# Patient Record
Sex: MALE | Race: WHITE
[De-identification: names, ages, dates, MRNs, and addresses within clinical notes are randomized per-mention and may not be internally consistent; named-entity substitution may affect disease eponyms.]

---

## 2023-06-02 ENCOUNTER — HOSPITAL ENCOUNTER (OUTPATIENT)
Dept: HOSPITAL 82 - ED | Age: 88
Setting detail: OBSERVATION
LOS: 4 days | Discharge: NURSING, REHAB OR LONG-TERM CARE | End: 2023-06-06
Attending: INTERNAL MEDICINE | Admitting: INTERNAL MEDICINE
Payer: MEDICARE

## 2023-06-02 VITALS — SYSTOLIC BLOOD PRESSURE: 130 MMHG | DIASTOLIC BLOOD PRESSURE: 65 MMHG

## 2023-06-02 VITALS — DIASTOLIC BLOOD PRESSURE: 61 MMHG | SYSTOLIC BLOOD PRESSURE: 130 MMHG

## 2023-06-02 VITALS — SYSTOLIC BLOOD PRESSURE: 140 MMHG | DIASTOLIC BLOOD PRESSURE: 66 MMHG

## 2023-06-02 VITALS — SYSTOLIC BLOOD PRESSURE: 120 MMHG | DIASTOLIC BLOOD PRESSURE: 59 MMHG

## 2023-06-02 VITALS — SYSTOLIC BLOOD PRESSURE: 126 MMHG | DIASTOLIC BLOOD PRESSURE: 63 MMHG

## 2023-06-02 VITALS — HEIGHT: 70 IN | BODY MASS INDEX: 25.94 KG/M2 | WEIGHT: 181.22 LBS

## 2023-06-02 VITALS — DIASTOLIC BLOOD PRESSURE: 57 MMHG | SYSTOLIC BLOOD PRESSURE: 116 MMHG

## 2023-06-02 VITALS — DIASTOLIC BLOOD PRESSURE: 57 MMHG | SYSTOLIC BLOOD PRESSURE: 125 MMHG

## 2023-06-02 VITALS — SYSTOLIC BLOOD PRESSURE: 128 MMHG | DIASTOLIC BLOOD PRESSURE: 62 MMHG

## 2023-06-02 VITALS — SYSTOLIC BLOOD PRESSURE: 128 MMHG | DIASTOLIC BLOOD PRESSURE: 64 MMHG

## 2023-06-02 VITALS — SYSTOLIC BLOOD PRESSURE: 115 MMHG | DIASTOLIC BLOOD PRESSURE: 59 MMHG

## 2023-06-02 VITALS — DIASTOLIC BLOOD PRESSURE: 52 MMHG | SYSTOLIC BLOOD PRESSURE: 112 MMHG

## 2023-06-02 VITALS — DIASTOLIC BLOOD PRESSURE: 59 MMHG | SYSTOLIC BLOOD PRESSURE: 129 MMHG

## 2023-06-02 DIAGNOSIS — Z91.81: ICD-10-CM

## 2023-06-02 DIAGNOSIS — D72.829: ICD-10-CM

## 2023-06-02 DIAGNOSIS — Z95.5: ICD-10-CM

## 2023-06-02 DIAGNOSIS — I10: ICD-10-CM

## 2023-06-02 DIAGNOSIS — R62.7: ICD-10-CM

## 2023-06-02 DIAGNOSIS — N17.9: ICD-10-CM

## 2023-06-02 DIAGNOSIS — F03.90: ICD-10-CM

## 2023-06-02 DIAGNOSIS — R53.1: Primary | ICD-10-CM

## 2023-06-02 DIAGNOSIS — I25.10: ICD-10-CM

## 2023-06-02 DIAGNOSIS — Z87.891: ICD-10-CM

## 2023-06-02 DIAGNOSIS — I95.9: ICD-10-CM

## 2023-06-02 LAB
ALBUMIN SERPL-MCNC: 4.1 G/DL (ref 3.2–5)
ALP SERPL-CCNC: 101 U/L (ref 38–126)
ANION GAP SERPL CALCULATED.3IONS-SCNC: 12 MMOL/L
AST SERPL-CCNC: 31 U/L (ref 19–48)
BASOPHILS NFR BLD AUTO: 0.3 % (ref 0–3)
BUN SERPL-MCNC: 33 MG/DL (ref 8–23)
BUN/CREAT SERPL: 19
CHLORIDE SERPL-SCNC: 104 MMOL/L (ref 95–108)
CO2 SERPL-SCNC: 25 MMOL/L (ref 22–30)
CREAT SERPL-MCNC: 1.7 MG/DL (ref 0.7–1.3)
EOSINOPHIL NFR BLD AUTO: 0.9 % (ref 0–8)
ERYTHROCYTE [DISTWIDTH] IN BLOOD BY AUTOMATED COUNT: 17.3 % (ref 11.5–15.5)
HCT VFR BLD AUTO: 37.2 % (ref 39–50)
HGB BLD-MCNC: 11.9 G/DL (ref 14–18)
IMM GRANULOCYTES NFR BLD: 0.7 % (ref 0–5)
LYMPHOCYTES NFR BLD: 10.1 % (ref 15–41)
MCH RBC QN AUTO: 30.5 PG  CALC (ref 26–32)
MCHC RBC AUTO-ENTMCNC: 32 G/DL CAL (ref 32–36)
MCV RBC AUTO: 95.4 FL  CALC (ref 80–100)
MONOCYTES NFR BLD AUTO: 12.7 % (ref 2–13)
NEUTROPHILS # BLD AUTO: 11.37 THOU/UL (ref 1.82–7.42)
NEUTROPHILS NFR BLD AUTO: 75.3 % (ref 42–76)
PLATELET # BLD AUTO: 213 THOU/UL (ref 130–400)
POTASSIUM SERPL-SCNC: 4.9 MMOL/L (ref 3.5–5.1)
PROT SERPL-MCNC: 6.8 G/DL (ref 6.3–8.2)
RBC # BLD AUTO: 3.9 MILL/UL (ref 4.7–6.1)
SODIUM SERPL-SCNC: 136 MMOL/L (ref 137–146)

## 2023-06-02 NOTE — NUR
IN ROOM TO COLLECT URINE, PT REFUSED, WANTS TO WAIT [facility]NTIL [facility]

## 2023-06-02 NOTE — NUR
REPORT RECEIVED FROM ER NURSE. PATIENT ARRIVED TO ROOM 280 AT 2120 VIA
STRETCHER. DENIES PAIN OR DISCOMFORT. RESPIRATIONS EVEN AND UNLABORED ON ROOM
AIR. ORIENTED TO ROOM AND CALL LIGHT. MEDICATED PER MD ORDER. NEW ORDER
RECEIVED FOR SLEEP AID PER PATIENT REQUEST. CALL LIGHT WITHIN REACH.

## 2023-06-03 VITALS — DIASTOLIC BLOOD PRESSURE: 51 MMHG | SYSTOLIC BLOOD PRESSURE: 117 MMHG

## 2023-06-03 VITALS — SYSTOLIC BLOOD PRESSURE: 154 MMHG | DIASTOLIC BLOOD PRESSURE: 76 MMHG

## 2023-06-03 VITALS — DIASTOLIC BLOOD PRESSURE: 69 MMHG | SYSTOLIC BLOOD PRESSURE: 138 MMHG

## 2023-06-03 VITALS — SYSTOLIC BLOOD PRESSURE: 111 MMHG | DIASTOLIC BLOOD PRESSURE: 50 MMHG

## 2023-06-03 VITALS — SYSTOLIC BLOOD PRESSURE: 134 MMHG | DIASTOLIC BLOOD PRESSURE: 65 MMHG

## 2023-06-03 VITALS — SYSTOLIC BLOOD PRESSURE: 139 MMHG | DIASTOLIC BLOOD PRESSURE: 67 MMHG

## 2023-06-03 VITALS — DIASTOLIC BLOOD PRESSURE: 50 MMHG | SYSTOLIC BLOOD PRESSURE: 111 MMHG

## 2023-06-03 VITALS — DIASTOLIC BLOOD PRESSURE: 55 MMHG | SYSTOLIC BLOOD PRESSURE: 124 MMHG

## 2023-06-03 LAB
ALBUMIN SERPL-MCNC: 3.7 G/DL (ref 3.2–5)
ALP SERPL-CCNC: 99 U/L (ref 38–126)
ANION GAP SERPL CALCULATED.3IONS-SCNC: 9 MMOL/L
AST SERPL-CCNC: 31 U/L (ref 19–48)
BILIRUB UR QL STRIP.AUTO: NEGATIVE
BUN SERPL-MCNC: 29 MG/DL (ref 8–23)
BUN/CREAT SERPL: 23
CHLORIDE SERPL-SCNC: 106 MMOL/L (ref 95–108)
CO2 SERPL-SCNC: 28 MMOL/L (ref 22–30)
COLOR UR AUTO: YELLOW
CREAT SERPL-MCNC: 1.3 MG/DL (ref 0.7–1.3)
ERYTHROCYTE [DISTWIDTH] IN BLOOD BY AUTOMATED COUNT: 17.2 % (ref 11.5–15.5)
GLUCOSE UR STRIP.AUTO-MCNC: NEGATIVE MG/DL
HCT VFR BLD AUTO: 36.7 % (ref 39–50)
HGB BLD-MCNC: 11.9 G/DL (ref 14–18)
HGB UR QL STRIP.AUTO: NEGATIVE
KETONES UR STRIP.AUTO-MCNC: NEGATIVE MG/DL
LEUKOCYTE ESTERASE UR QL STRIP.AUTO: NEGATIVE
MAGNESIUM SERPL-MCNC: 2.3 MG/DL (ref 1.6–2.3)
MCH RBC QN AUTO: 30.7 PG  CALC (ref 26–32)
MCHC RBC AUTO-ENTMCNC: 32.4 G/DL CAL (ref 32–36)
MCV RBC AUTO: 94.6 FL  CALC (ref 80–100)
NITRITE UR QL STRIP.AUTO: NEGATIVE
PH UR STRIP.AUTO: 5.5 [PH] (ref 4.5–8)
PLATELET # BLD AUTO: 210 THOU/UL (ref 130–400)
POTASSIUM SERPL-SCNC: 4.9 MMOL/L (ref 3.5–5.1)
PROT SERPL-MCNC: 6.4 G/DL (ref 6.3–8.2)
PROT UR QL STRIP.AUTO: NEGATIVE MG/DL
RBC # BLD AUTO: 3.88 MILL/UL (ref 4.7–6.1)
SODIUM SERPL-SCNC: 138 MMOL/L (ref 137–146)
SP GR UR STRIP.AUTO: 1.01
UROBILINOGEN UR QL STRIP.AUTO: 0.2 E.U./DL

## 2023-06-03 NOTE — NUR
PT RESTING IN BED WATCHING TV, NO SIGNS OF DISTRESS NOTED, RESP EVEN AND
UNLABORED. PT ALERT AND ORIENTED X3, DISCUSSED POC, VERBALIZED UNDERSTANDING.
NO EDEMA. PT HAS A SKIN TEAR TO RFA, DRESSING CDI. IVF INFUSING, ASSESSMENT
COMPLETED, DENIES ANY NEEDS OR COMPLAINTS AT THIS TIME. BED ALARM FOR SAFETY,
CALL LIGHT IN REACH,CONTINUE TO MONITOR.

## 2023-06-03 NOTE — NUR
PATIENT AWAKE IN BED. DENIES PAIN OR DISCOMFORT. RESPIRATIONS EVEN AND
UNLABORED ON ROOM AIR. CALL LIGHT WITHIN REACH.

## 2023-06-03 NOTE — NUR
PATIENT ASLEEP IN BED BUT EASILY AROUSED. RESPIRATIONS EVEN AND UNLABORED ON
ROOM AIR. ABLE TO USE URINAL INDEPENDENTLY. DENIES PAIN OR DISCOMFORT. CALL
LIGHT WITHIN REACH.

## 2023-06-03 NOTE — NUR
PT RESTING IN BED. NO CURRENT NEEDS. STATES HE IS LOOKING FORWARD TO GOING TO
REHAB TO GET STRONGER. VSS. ALL SAFETY MEASURES IN PLACE.

## 2023-06-03 NOTE — NUR
REPORT RECEIVED FROM PM RN. PT RESTING IN BED. ALL SAFETY MEASURES IN PLACE.
VSS. NO NEEDS AT THIS TIME.

## 2023-06-04 VITALS — SYSTOLIC BLOOD PRESSURE: 113 MMHG | DIASTOLIC BLOOD PRESSURE: 56 MMHG

## 2023-06-04 VITALS — DIASTOLIC BLOOD PRESSURE: 65 MMHG | SYSTOLIC BLOOD PRESSURE: 134 MMHG

## 2023-06-04 VITALS — SYSTOLIC BLOOD PRESSURE: 115 MMHG | DIASTOLIC BLOOD PRESSURE: 57 MMHG

## 2023-06-04 VITALS — DIASTOLIC BLOOD PRESSURE: 71 MMHG | SYSTOLIC BLOOD PRESSURE: 141 MMHG

## 2023-06-04 LAB
ALBUMIN SERPL-MCNC: 3.5 G/DL (ref 3.2–5)
ALP SERPL-CCNC: 98 U/L (ref 38–126)
ANION GAP SERPL CALCULATED.3IONS-SCNC: 8 MMOL/L
AST SERPL-CCNC: 40 U/L (ref 19–48)
BUN SERPL-MCNC: 23 MG/DL (ref 8–23)
BUN/CREAT SERPL: 23
CHLORIDE SERPL-SCNC: 107 MMOL/L (ref 95–108)
CO2 SERPL-SCNC: 26 MMOL/L (ref 22–30)
CREAT SERPL-MCNC: 1 MG/DL (ref 0.7–1.3)
ERYTHROCYTE [DISTWIDTH] IN BLOOD BY AUTOMATED COUNT: 17.3 % (ref 11.5–15.5)
HCT VFR BLD AUTO: 34.8 % (ref 39–50)
HGB BLD-MCNC: 11.3 G/DL (ref 14–18)
MAGNESIUM SERPL-MCNC: 2.3 MG/DL (ref 1.6–2.3)
MCH RBC QN AUTO: 30.7 PG  CALC (ref 26–32)
MCHC RBC AUTO-ENTMCNC: 32.5 G/DL CAL (ref 32–36)
MCV RBC AUTO: 94.6 FL  CALC (ref 80–100)
PLATELET # BLD AUTO: 214 THOU/UL (ref 130–400)
POTASSIUM SERPL-SCNC: 4.7 MMOL/L (ref 3.5–5.1)
PROT SERPL-MCNC: 6.1 G/DL (ref 6.3–8.2)
RBC # BLD AUTO: 3.68 MILL/UL (ref 4.7–6.1)
SODIUM SERPL-SCNC: 137 MMOL/L (ref 137–146)

## 2023-06-04 NOTE — NUR
RECEIVED REPORT FROM DAYSHIFT NURSE. PT IS LYING IN BED . ADVISED PT OF CHANGE
OF NURSE FOR THE NIGHT. PT STATES THAT HE IS NOT IN PAIN. CALL LIGHT WITHIN
REACH AND SAFETY PRECAUTIONS IN PLACE.

## 2023-06-04 NOTE — NUR
PT RESTING IN BED, NO SIGNS OF DISTRESS NOTED, RESP EVEN AND UNLABORED. PT
DENIES ANY NEEDS OR COMPLAINTS AT THIS TIME. CALL LIGHT IN REACH,CONTINUE TO
MONITOR.

## 2023-06-04 NOTE — NUR
PT RESTING IN BED WITH EYES CLOSED, NO SIGNS OF DISTRESS NOTED, RESP EVEN AND
UNLABORED. CALL LIGHT IN REACH,CONTINUE TO MONITOR, BED ALARM FOR SAFETY.

## 2023-06-04 NOTE — NUR
PT RESTING IN RECLINER
WATCHING TELEVISION. ALL SAFETY MEASURES IN PLACE, VSS, NO
NEEDS AT THIS TIME.

## 2023-06-05 VITALS — DIASTOLIC BLOOD PRESSURE: 59 MMHG | SYSTOLIC BLOOD PRESSURE: 140 MMHG

## 2023-06-05 VITALS — DIASTOLIC BLOOD PRESSURE: 66 MMHG | SYSTOLIC BLOOD PRESSURE: 133 MMHG

## 2023-06-05 VITALS — SYSTOLIC BLOOD PRESSURE: 106 MMHG | DIASTOLIC BLOOD PRESSURE: 55 MMHG

## 2023-06-05 VITALS — SYSTOLIC BLOOD PRESSURE: 113 MMHG | DIASTOLIC BLOOD PRESSURE: 55 MMHG

## 2023-06-05 VITALS — DIASTOLIC BLOOD PRESSURE: 66 MMHG | SYSTOLIC BLOOD PRESSURE: 141 MMHG

## 2023-06-05 VITALS — DIASTOLIC BLOOD PRESSURE: 68 MMHG | SYSTOLIC BLOOD PRESSURE: 127 MMHG

## 2023-06-05 VITALS — DIASTOLIC BLOOD PRESSURE: 68 MMHG | SYSTOLIC BLOOD PRESSURE: 148 MMHG

## 2023-06-05 NOTE — NUR
PT IN BED ALERT AND OREINTED X 25, REORIENTED TO TIME. PT HAS NO CHANGE IN
STATUS AT THIS TIME. NS @ 100 ML/HR INFUSING TO RFA. PT HAS CALL LIGHT WITHIN
REACH AND ALL SAFETY MEASURES IN PLACE AT THIS TIME.

## 2023-06-05 NOTE — NUR
RECEIVED REPORT FROM NURSE LEONCIO, PATIENT RESTING IN BED WATCHING TV, PATIENT IV
ON LFA G 18 NS @ 10CC/HR INFUSING WELL, HOOKED ON TELEMETRY, LUNG SOUNDS
CLEAR/DIMINISHED, SKIN TEAR NOTED ON RFA, DRESSING CDI, SCATTERED BRUISING ON
BILAT FOREARAMS, BED ALRM IN PLACE, PATEINT REQUESTEDE SLEEP AID, STATED
MEDICATION TAKEN YESTERDAY WAS NOT WORKING, MD AWARE.

## 2023-06-05 NOTE — NUR
PT IN BED WITH HOB UP, EATING BREAKFAST WITH SOME ASSIST. PT ALERT AND
OREINTED TO SELF AND PLACE  AT TIME, PT HAS NO C/O PAIN.  BRUISING NOTES TO
BILAT ARMS. IV SITE TO LFA INTACT WITH NS @ 10 ML/HR INFUSING. TELE ON WITH
ALL LEADS ATTACHED. PT HAS URINAL AT BEDSIDE AND BEDSIDE COMMODE FOR TOILETING
NEEDS. PT HAS CALL LIGHT WITHIN REACH AND SAFETY MEASURES IN PLACE AT THIS
TIME.

## 2023-06-05 NOTE — NUR
PT IS SLEEPING IN BED COMFORTABLY. PT SHOWS NO SIGNS OF PAIN OR DISCOMFORT AT
THIS TIME. PT'S CALL LIGHT IS WITHIN REACH AND SAFETY PRECAUTIONS IN PLACE

## 2023-06-05 NOTE — NUR
PT IS SLEEPING IN BED COMFORTABLY. PT SHOWS NO SIGNS OF PAIN OR DISCOMFORT.
CALL LIGHT WITHIN REACH AND SAFETY PRECAUTIONS IN PLACE.

## 2023-06-05 NOTE — NUR
PT IN BED RESTING WITH EYES CLOSED, AWAKENED TO VOICE. PT HAS NO C/O PAIN AT
THIS TIME AND NO CHANGE IN STATUS. CALL LIGHT WITHIN REACH AND ALL SAFETY
MEASURES IN PLACE.

## 2023-06-06 VITALS — SYSTOLIC BLOOD PRESSURE: 104 MMHG | DIASTOLIC BLOOD PRESSURE: 44 MMHG

## 2023-06-06 VITALS — DIASTOLIC BLOOD PRESSURE: 53 MMHG | SYSTOLIC BLOOD PRESSURE: 103 MMHG

## 2023-06-06 VITALS — DIASTOLIC BLOOD PRESSURE: 60 MMHG | SYSTOLIC BLOOD PRESSURE: 133 MMHG

## 2023-06-06 LAB
ALBUMIN SERPL-MCNC: 3.5 G/DL (ref 3.2–5)
ALP SERPL-CCNC: 108 U/L (ref 38–126)
ANION GAP SERPL CALCULATED.3IONS-SCNC: 10 MMOL/L
AST SERPL-CCNC: 28 U/L (ref 19–48)
BUN SERPL-MCNC: 22 MG/DL (ref 8–23)
BUN/CREAT SERPL: 19
CHLORIDE SERPL-SCNC: 105 MMOL/L (ref 95–108)
CO2 SERPL-SCNC: 25 MMOL/L (ref 22–30)
CREAT SERPL-MCNC: 1.1 MG/DL (ref 0.7–1.3)
ERYTHROCYTE [DISTWIDTH] IN BLOOD BY AUTOMATED COUNT: 17.1 % (ref 11.5–15.5)
HCT VFR BLD AUTO: 37.2 % (ref 39–50)
HGB BLD-MCNC: 12.3 G/DL (ref 14–18)
MAGNESIUM SERPL-MCNC: 2.2 MG/DL (ref 1.6–2.3)
MCH RBC QN AUTO: 30.9 PG  CALC (ref 26–32)
MCHC RBC AUTO-ENTMCNC: 33.1 G/DL CAL (ref 32–36)
MCV RBC AUTO: 93.5 FL  CALC (ref 80–100)
PLATELET # BLD AUTO: 240 THOU/UL (ref 130–400)
POTASSIUM SERPL-SCNC: 4.9 MMOL/L (ref 3.5–5.1)
PROT SERPL-MCNC: 5.9 G/DL (ref 6.3–8.2)
RBC # BLD AUTO: 3.98 MILL/UL (ref 4.7–6.1)
SODIUM SERPL-SCNC: 135 MMOL/L (ref 137–146)

## 2023-06-06 NOTE — NUR
PATIENT IN CHAIR, WALKED WITH PT, TOLERATED WELL, NO S/S OF DISTRESS, PATIENT
SAFETY MEASURES IN PLACE.

## 2023-06-06 NOTE — NUR
PATIENT RESTING IN BED, NOTR IN DISTRESS, BREATHING UNLABORED CALL LIGHT IN
REACH, BED ALARM IN PLACE.

## 2023-06-06 NOTE — NUR
BEDSIDE REPORT RECIEVED, FLUIDS AT [facility], NO S/S OF DISTRESS, NO NEEDS AT THIS
TIME,WHITEBOARD UPDATED. PATIENT SAFETY MEASURES IN PLACE.

## 2023-06-17 ENCOUNTER — HOSPITAL ENCOUNTER (OUTPATIENT)
Dept: HOSPITAL 82 - ED | Age: 88
Setting detail: OBSERVATION
LOS: 3 days | Discharge: NURSING, REHAB OR LONG-TERM CARE | End: 2023-06-20
Attending: INTERNAL MEDICINE | Admitting: INTERNAL MEDICINE
Payer: MEDICARE

## 2023-06-17 VITALS — DIASTOLIC BLOOD PRESSURE: 68 MMHG | SYSTOLIC BLOOD PRESSURE: 141 MMHG

## 2023-06-17 VITALS — DIASTOLIC BLOOD PRESSURE: 60 MMHG | SYSTOLIC BLOOD PRESSURE: 117 MMHG

## 2023-06-17 VITALS — SYSTOLIC BLOOD PRESSURE: 145 MMHG | DIASTOLIC BLOOD PRESSURE: 61 MMHG

## 2023-06-17 VITALS — WEIGHT: 176.37 LBS | HEIGHT: 70 IN | BODY MASS INDEX: 25.25 KG/M2

## 2023-06-17 VITALS — SYSTOLIC BLOOD PRESSURE: 135 MMHG | DIASTOLIC BLOOD PRESSURE: 71 MMHG

## 2023-06-17 VITALS — SYSTOLIC BLOOD PRESSURE: 114 MMHG | DIASTOLIC BLOOD PRESSURE: 57 MMHG

## 2023-06-17 VITALS — DIASTOLIC BLOOD PRESSURE: 61 MMHG | SYSTOLIC BLOOD PRESSURE: 145 MMHG

## 2023-06-17 VITALS — DIASTOLIC BLOOD PRESSURE: 58 MMHG | SYSTOLIC BLOOD PRESSURE: 116 MMHG

## 2023-06-17 VITALS — DIASTOLIC BLOOD PRESSURE: 66 MMHG | SYSTOLIC BLOOD PRESSURE: 141 MMHG

## 2023-06-17 VITALS — DIASTOLIC BLOOD PRESSURE: 65 MMHG | SYSTOLIC BLOOD PRESSURE: 114 MMHG

## 2023-06-17 DIAGNOSIS — F03.93: ICD-10-CM

## 2023-06-17 DIAGNOSIS — E86.0: ICD-10-CM

## 2023-06-17 DIAGNOSIS — R53.1: Primary | ICD-10-CM

## 2023-06-17 DIAGNOSIS — Z87.891: ICD-10-CM

## 2023-06-17 DIAGNOSIS — R62.7: ICD-10-CM

## 2023-06-17 DIAGNOSIS — M19.90: ICD-10-CM

## 2023-06-17 DIAGNOSIS — I10: ICD-10-CM

## 2023-06-17 DIAGNOSIS — Z60.2: ICD-10-CM

## 2023-06-17 DIAGNOSIS — I25.10: ICD-10-CM

## 2023-06-17 DIAGNOSIS — N17.9: ICD-10-CM

## 2023-06-17 DIAGNOSIS — Z95.5: ICD-10-CM

## 2023-06-17 DIAGNOSIS — Z20.822: ICD-10-CM

## 2023-06-17 DIAGNOSIS — F03.918: ICD-10-CM

## 2023-06-17 DIAGNOSIS — K59.00: ICD-10-CM

## 2023-06-17 LAB
ALBUMIN SERPL-MCNC: 4.2 G/DL (ref 3.2–5)
ALP SERPL-CCNC: 160 U/L (ref 38–126)
ANION GAP SERPL CALCULATED.3IONS-SCNC: 12 MMOL/L
AST SERPL-CCNC: 35 U/L (ref 19–48)
BASOPHILS NFR BLD AUTO: 0.8 % (ref 0–3)
BILIRUB UR QL STRIP.AUTO: NEGATIVE
BUN SERPL-MCNC: 43 MG/DL (ref 8–23)
BUN/CREAT SERPL: 23
CHLORIDE SERPL-SCNC: 103 MMOL/L (ref 95–108)
CK SERPL-CCNC: 70 U/L (ref 55–170)
CO2 SERPL-SCNC: 26 MMOL/L (ref 22–30)
COLOR UR AUTO: YELLOW
CREAT SERPL-MCNC: 1.9 MG/DL (ref 0.7–1.3)
EOSINOPHIL NFR BLD AUTO: 3.2 % (ref 0–8)
ERYTHROCYTE [DISTWIDTH] IN BLOOD BY AUTOMATED COUNT: 17.1 % (ref 11.5–15.5)
GLUCOSE UR STRIP.AUTO-MCNC: NEGATIVE MG/DL
HCT VFR BLD AUTO: 35.4 % (ref 39–50)
HGB BLD-MCNC: 11.4 G/DL (ref 14–18)
HGB UR QL STRIP.AUTO: NEGATIVE
IMM GRANULOCYTES NFR BLD: 1.3 % (ref 0–5)
KETONES UR STRIP.AUTO-MCNC: NEGATIVE MG/DL
LEUKOCYTE ESTERASE UR QL STRIP.AUTO: NEGATIVE
LYMPHOCYTES NFR BLD: 17.3 % (ref 15–41)
MAGNESIUM SERPL-MCNC: 2.6 MG/DL (ref 1.6–2.3)
MCH RBC QN AUTO: 30.6 PG  CALC (ref 26–32)
MCHC RBC AUTO-ENTMCNC: 32.2 G/DL CAL (ref 32–36)
MCV RBC AUTO: 94.9 FL  CALC (ref 80–100)
MONOCYTES NFR BLD AUTO: 12.3 % (ref 2–13)
NEUTROPHILS # BLD AUTO: 7.83 THOU/UL (ref 1.82–7.42)
NEUTROPHILS NFR BLD AUTO: 65.1 % (ref 42–76)
NITRITE UR QL STRIP.AUTO: NEGATIVE
PH UR STRIP.AUTO: 5.5 [PH] (ref 4.5–8)
PLATELET # BLD AUTO: 294 THOU/UL (ref 130–400)
POTASSIUM SERPL-SCNC: 4.3 MMOL/L (ref 3.5–5.1)
PROT SERPL-MCNC: 7 G/DL (ref 6.3–8.2)
PROT UR QL STRIP.AUTO: NEGATIVE MG/DL
RBC # BLD AUTO: 3.73 MILL/UL (ref 4.7–6.1)
SODIUM SERPL-SCNC: 137 MMOL/L (ref 137–146)
SP GR UR STRIP.AUTO: >=1.03
TSH SERPL DL<=0.05 MIU/L-ACNC: 2.54 UIU/ML (ref 0.47–4.68)
UROBILINOGEN UR QL STRIP.AUTO: 0.2 E.U./DL

## 2023-06-17 SDOH — SOCIAL STABILITY - SOCIAL INSECURITY: PROBLEMS RELATED TO LIVING ALONE: Z60.2

## 2023-06-18 VITALS — DIASTOLIC BLOOD PRESSURE: 71 MMHG | SYSTOLIC BLOOD PRESSURE: 135 MMHG

## 2023-06-18 VITALS — SYSTOLIC BLOOD PRESSURE: 135 MMHG | DIASTOLIC BLOOD PRESSURE: 68 MMHG

## 2023-06-18 VITALS — DIASTOLIC BLOOD PRESSURE: 67 MMHG | SYSTOLIC BLOOD PRESSURE: 144 MMHG

## 2023-06-18 VITALS — DIASTOLIC BLOOD PRESSURE: 68 MMHG | SYSTOLIC BLOOD PRESSURE: 135 MMHG

## 2023-06-18 VITALS — SYSTOLIC BLOOD PRESSURE: 140 MMHG | DIASTOLIC BLOOD PRESSURE: 69 MMHG

## 2023-06-18 LAB
ANION GAP SERPL CALCULATED.3IONS-SCNC: 9 MMOL/L
BASOPHILS NFR BLD AUTO: 1 % (ref 0–3)
BUN SERPL-MCNC: 30 MG/DL (ref 8–23)
BUN/CREAT SERPL: 23
CHLORIDE SERPL-SCNC: 106 MMOL/L (ref 95–108)
CO2 SERPL-SCNC: 28 MMOL/L (ref 22–30)
CREAT SERPL-MCNC: 1.3 MG/DL (ref 0.7–1.3)
EOSINOPHIL NFR BLD AUTO: 5.4 % (ref 0–8)
ERYTHROCYTE [DISTWIDTH] IN BLOOD BY AUTOMATED COUNT: 17.1 % (ref 11.5–15.5)
HCT VFR BLD AUTO: 34.2 % (ref 39–50)
HGB BLD-MCNC: 11 G/DL (ref 14–18)
IMM GRANULOCYTES NFR BLD: 1.1 % (ref 0–5)
LYMPHOCYTES NFR BLD: 28.2 % (ref 15–41)
MAGNESIUM SERPL-MCNC: 2.6 MG/DL (ref 1.6–2.3)
MCH RBC QN AUTO: 30.1 PG  CALC (ref 26–32)
MCHC RBC AUTO-ENTMCNC: 32.2 G/DL CAL (ref 32–36)
MCV RBC AUTO: 93.4 FL  CALC (ref 80–100)
MONOCYTES NFR BLD AUTO: 12.2 % (ref 2–13)
NEUTROPHILS # BLD AUTO: 4.15 THOU/UL (ref 1.82–7.42)
NEUTROPHILS NFR BLD AUTO: 52.1 % (ref 42–76)
PLATELET # BLD AUTO: 256 THOU/UL (ref 130–400)
POTASSIUM SERPL-SCNC: 4.8 MMOL/L (ref 3.5–5.1)
RBC # BLD AUTO: 3.66 MILL/UL (ref 4.7–6.1)
SODIUM SERPL-SCNC: 138 MMOL/L (ref 137–146)

## 2023-06-19 VITALS — DIASTOLIC BLOOD PRESSURE: 59 MMHG | SYSTOLIC BLOOD PRESSURE: 117 MMHG

## 2023-06-19 VITALS — SYSTOLIC BLOOD PRESSURE: 114 MMHG | DIASTOLIC BLOOD PRESSURE: 60 MMHG

## 2023-06-19 VITALS — DIASTOLIC BLOOD PRESSURE: 79 MMHG | SYSTOLIC BLOOD PRESSURE: 143 MMHG

## 2023-06-19 VITALS — SYSTOLIC BLOOD PRESSURE: 118 MMHG | DIASTOLIC BLOOD PRESSURE: 75 MMHG

## 2023-06-19 LAB
ANION GAP SERPL CALCULATED.3IONS-SCNC: 11 MMOL/L
BASOPHILS NFR BLD AUTO: 1 % (ref 0–3)
BUN SERPL-MCNC: 23 MG/DL (ref 8–23)
BUN/CREAT SERPL: 19
CHLORIDE SERPL-SCNC: 104 MMOL/L (ref 95–108)
CO2 SERPL-SCNC: 26 MMOL/L (ref 22–30)
CREAT SERPL-MCNC: 1.2 MG/DL (ref 0.7–1.3)
EOSINOPHIL NFR BLD AUTO: 3.8 % (ref 0–8)
ERYTHROCYTE [DISTWIDTH] IN BLOOD BY AUTOMATED COUNT: 16.9 % (ref 11.5–15.5)
HCT VFR BLD AUTO: 36.9 % (ref 39–50)
HGB BLD-MCNC: 12 G/DL (ref 14–18)
IMM GRANULOCYTES NFR BLD: 1.2 % (ref 0–5)
LYMPHOCYTES NFR BLD: 18.6 % (ref 15–41)
MAGNESIUM SERPL-MCNC: 2.3 MG/DL (ref 1.6–2.3)
MCH RBC QN AUTO: 30.2 PG  CALC (ref 26–32)
MCHC RBC AUTO-ENTMCNC: 32.5 G/DL CAL (ref 32–36)
MCV RBC AUTO: 92.9 FL  CALC (ref 80–100)
MONOCYTES NFR BLD AUTO: 12.3 % (ref 2–13)
NEUTROPHILS # BLD AUTO: 6.33 THOU/UL (ref 1.82–7.42)
NEUTROPHILS NFR BLD AUTO: 63.1 % (ref 42–76)
PLATELET # BLD AUTO: 262 THOU/UL (ref 130–400)
POTASSIUM SERPL-SCNC: 4.9 MMOL/L (ref 3.5–5.1)
RBC # BLD AUTO: 3.97 MILL/UL (ref 4.7–6.1)
SODIUM SERPL-SCNC: 136 MMOL/L (ref 137–146)

## 2023-06-20 VITALS — SYSTOLIC BLOOD PRESSURE: 114 MMHG | DIASTOLIC BLOOD PRESSURE: 64 MMHG

## 2023-06-20 VITALS — SYSTOLIC BLOOD PRESSURE: 114 MMHG | DIASTOLIC BLOOD PRESSURE: 61 MMHG

## 2023-08-22 ENCOUNTER — HOSPITAL ENCOUNTER (EMERGENCY)
Dept: HOSPITAL 82 - ED | Age: 88
Discharge: HOME | End: 2023-08-22
Payer: MEDICARE

## 2023-08-22 VITALS — DIASTOLIC BLOOD PRESSURE: 72 MMHG | SYSTOLIC BLOOD PRESSURE: 139 MMHG

## 2023-08-22 VITALS — WEIGHT: 165.35 LBS | HEIGHT: 70 IN | BODY MASS INDEX: 23.67 KG/M2

## 2023-08-22 VITALS — DIASTOLIC BLOOD PRESSURE: 67 MMHG | SYSTOLIC BLOOD PRESSURE: 148 MMHG

## 2023-08-22 VITALS — DIASTOLIC BLOOD PRESSURE: 83 MMHG | SYSTOLIC BLOOD PRESSURE: 124 MMHG

## 2023-08-22 VITALS — DIASTOLIC BLOOD PRESSURE: 66 MMHG | SYSTOLIC BLOOD PRESSURE: 122 MMHG

## 2023-08-22 VITALS — DIASTOLIC BLOOD PRESSURE: 74 MMHG | SYSTOLIC BLOOD PRESSURE: 135 MMHG

## 2023-08-22 VITALS — DIASTOLIC BLOOD PRESSURE: 73 MMHG | SYSTOLIC BLOOD PRESSURE: 121 MMHG

## 2023-08-22 VITALS — SYSTOLIC BLOOD PRESSURE: 137 MMHG | DIASTOLIC BLOOD PRESSURE: 76 MMHG

## 2023-08-22 VITALS — DIASTOLIC BLOOD PRESSURE: 64 MMHG | SYSTOLIC BLOOD PRESSURE: 116 MMHG

## 2023-08-22 VITALS — SYSTOLIC BLOOD PRESSURE: 125 MMHG | DIASTOLIC BLOOD PRESSURE: 70 MMHG

## 2023-08-22 VITALS — DIASTOLIC BLOOD PRESSURE: 71 MMHG | SYSTOLIC BLOOD PRESSURE: 149 MMHG

## 2023-08-22 VITALS — DIASTOLIC BLOOD PRESSURE: 67 MMHG | SYSTOLIC BLOOD PRESSURE: 125 MMHG

## 2023-08-22 VITALS — DIASTOLIC BLOOD PRESSURE: 73 MMHG | SYSTOLIC BLOOD PRESSURE: 145 MMHG

## 2023-08-22 VITALS — DIASTOLIC BLOOD PRESSURE: 78 MMHG | SYSTOLIC BLOOD PRESSURE: 152 MMHG

## 2023-08-22 VITALS — SYSTOLIC BLOOD PRESSURE: 141 MMHG | DIASTOLIC BLOOD PRESSURE: 63 MMHG

## 2023-08-22 DIAGNOSIS — E86.0: ICD-10-CM

## 2023-08-22 DIAGNOSIS — R30.0: Primary | ICD-10-CM

## 2023-08-22 LAB
ALBUMIN SERPL-MCNC: 3.5 G/DL (ref 3.2–5)
ALP SERPL-CCNC: 123 U/L (ref 38–126)
ANION GAP SERPL CALCULATED.3IONS-SCNC: 6 MMOL/L
AST SERPL-CCNC: 32 U/L (ref 19–48)
BASOPHILS NFR BLD AUTO: 1 % (ref 0–3)
BUN SERPL-MCNC: 19 MG/DL (ref 8–23)
BUN/CREAT SERPL: 13
CHLORIDE SERPL-SCNC: 104 MMOL/L (ref 95–108)
CO2 SERPL-SCNC: 32 MMOL/L (ref 22–30)
CREAT SERPL-MCNC: 1.5 MG/DL (ref 0.7–1.3)
EOSINOPHIL NFR BLD AUTO: 3.6 % (ref 0–8)
ERYTHROCYTE [DISTWIDTH] IN BLOOD BY AUTOMATED COUNT: 16.6 % (ref 11.5–15.5)
HCT VFR BLD AUTO: 34.8 % (ref 39–50)
HGB BLD-MCNC: 11.2 G/DL (ref 14–18)
IMM GRANULOCYTES NFR BLD: 2.7 % (ref 0–5)
LYMPHOCYTES NFR BLD: 22.2 % (ref 15–41)
MCH RBC QN AUTO: 30.3 PG  CALC (ref 26–32)
MCHC RBC AUTO-ENTMCNC: 32.2 G/DL CAL (ref 32–36)
MCV RBC AUTO: 94.1 FL  CALC (ref 80–100)
MONOCYTES NFR BLD AUTO: 13.7 % (ref 2–13)
NEUTROPHILS # BLD AUTO: 4.37 THOU/UL (ref 1.82–7.42)
NEUTROPHILS NFR BLD AUTO: 56.8 % (ref 42–76)
PH UR STRIP.AUTO: 7 [PH] (ref 4.5–8)
PLATELET # BLD AUTO: 200 THOU/UL (ref 130–400)
POTASSIUM SERPL-SCNC: 4.7 MMOL/L (ref 3.5–5.1)
PROT SERPL-MCNC: 6.3 G/DL (ref 6.3–8.2)
RBC # BLD AUTO: 3.7 MILL/UL (ref 4.7–6.1)
SODIUM SERPL-SCNC: 137 MMOL/L (ref 137–146)
SP GR UR STRIP.AUTO: 1.01
UROBILINOGEN UR QL STRIP.AUTO: 0.2 E.U./DL